# Patient Record
Sex: FEMALE | Race: WHITE | Employment: OTHER | URBAN - METROPOLITAN AREA
[De-identification: names, ages, dates, MRNs, and addresses within clinical notes are randomized per-mention and may not be internally consistent; named-entity substitution may affect disease eponyms.]

---

## 2023-03-23 ENCOUNTER — ANESTHESIA EVENT (OUTPATIENT)
Facility: HOSPITAL | Age: 73
End: 2023-03-23
Payer: MEDICARE

## 2023-03-23 ENCOUNTER — HOSPITAL ENCOUNTER (OUTPATIENT)
Facility: HOSPITAL | Age: 73
Setting detail: OUTPATIENT SURGERY
Discharge: HOME | End: 2023-03-23
Attending: INTERNAL MEDICINE | Admitting: INTERNAL MEDICINE
Payer: MEDICARE

## 2023-03-23 ENCOUNTER — ANESTHESIA (OUTPATIENT)
Facility: HOSPITAL | Age: 73
End: 2023-03-23
Payer: MEDICARE

## 2023-03-23 VITALS
BODY MASS INDEX: 20.93 KG/M2 | HEIGHT: 68 IN | WEIGHT: 138.1 LBS | OXYGEN SATURATION: 100 % | RESPIRATION RATE: 18 BRPM | TEMPERATURE: 97.5 F | HEART RATE: 70 BPM | DIASTOLIC BLOOD PRESSURE: 66 MMHG | SYSTOLIC BLOOD PRESSURE: 119 MMHG

## 2023-03-23 PROCEDURE — 6360000002 HC RX W HCPCS: Performed by: NURSE ANESTHETIST, CERTIFIED REGISTERED

## 2023-03-23 PROCEDURE — 3600007502: Performed by: INTERNAL MEDICINE

## 2023-03-23 PROCEDURE — 3700000001 HC ADD 15 MINUTES (ANESTHESIA): Performed by: INTERNAL MEDICINE

## 2023-03-23 PROCEDURE — 3700000000 HC ANESTHESIA ATTENDED CARE: Performed by: INTERNAL MEDICINE

## 2023-03-23 PROCEDURE — 7100000011 HC PHASE II RECOVERY - ADDTL 15 MIN: Performed by: INTERNAL MEDICINE

## 2023-03-23 PROCEDURE — 7100000010 HC PHASE II RECOVERY - FIRST 15 MIN: Performed by: INTERNAL MEDICINE

## 2023-03-23 PROCEDURE — 2580000003 HC RX 258: Performed by: INTERNAL MEDICINE

## 2023-03-23 PROCEDURE — 3600007512: Performed by: INTERNAL MEDICINE

## 2023-03-23 PROCEDURE — 2709999900 HC NON-CHARGEABLE SUPPLY: Performed by: INTERNAL MEDICINE

## 2023-03-23 RX ORDER — CARBOXYMETHYLCELLULOSE SODIUM 5 MG/ML
1 SOLUTION/ DROPS OPHTHALMIC 2 TIMES DAILY
COMMUNITY
Start: 2022-11-10 | End: 2023-11-10

## 2023-03-23 RX ORDER — SODIUM CHLORIDE 9 MG/ML
INJECTION, SOLUTION INTRAVENOUS CONTINUOUS
Status: DISCONTINUED | OUTPATIENT
Start: 2023-03-23 | End: 2023-03-23 | Stop reason: HOSPADM

## 2023-03-23 RX ORDER — PSEUDOEPHEDRINE HCL 30 MG
1 TABLET ORAL AS NEEDED
COMMUNITY
Start: 2020-10-08

## 2023-03-23 RX ORDER — RALOXIFENE HYDROCHLORIDE 60 MG/1
1 TABLET, FILM COATED ORAL DAILY
COMMUNITY
Start: 2018-09-06 | End: 2023-08-26

## 2023-03-23 RX ORDER — ALMOTRIPTAN 12.5 MG/1
1 TABLET, FILM COATED ORAL AS NEEDED
COMMUNITY
Start: 2020-08-27

## 2023-03-23 RX ORDER — PROPOFOL 10 MG/ML
INJECTION, EMULSION INTRAVENOUS PRN
Status: DISCONTINUED | OUTPATIENT
Start: 2023-03-23 | End: 2023-03-23 | Stop reason: SDUPTHER

## 2023-03-23 RX ORDER — ATORVASTATIN CALCIUM 10 MG/1
1 TABLET, FILM COATED ORAL DAILY
COMMUNITY
Start: 2008-01-17 | End: 2023-08-26

## 2023-03-23 RX ADMIN — PROPOFOL 70 MG: 10 INJECTION, EMULSION INTRAVENOUS at 14:51

## 2023-03-23 RX ADMIN — PROPOFOL 20 MG: 10 INJECTION, EMULSION INTRAVENOUS at 14:56

## 2023-03-23 RX ADMIN — PROPOFOL 30 MG: 10 INJECTION, EMULSION INTRAVENOUS at 14:54

## 2023-03-23 RX ADMIN — SODIUM CHLORIDE: 9 INJECTION, SOLUTION INTRAVENOUS at 14:25

## 2023-03-23 ASSESSMENT — PAIN - FUNCTIONAL ASSESSMENT: PAIN_FUNCTIONAL_ASSESSMENT: 0-10

## 2023-03-23 NOTE — H&P
vaginal route 2 times every week  01/14/2022 01/14/2022  N  Evista 60 mg tablet  take 1 tablet by oral route  every day  08/26/2022 08/26/2022  N  Lipitor 10 mg tablet  take 1 tablet by oral route  every day  08/26/2022 08/26/2022  N  almotriptan malate 12.5 mg tablet  take 1 tablet by oral route once ; if headache returns, the dose may be repeated after 2 hours, no morethan two doses per 24 hrs  11/10/2022    11/10/2022  N    Patient Status   Completed with information received for patient transitioning into care. Completed with information received for patient in a summary of care record. Medication Reconciliation  Medications reconciled today.     Medication Reviewed  Adherence  Medication Name  Sig Desc  Elsewhere  Status  taking as directed  Caltrate 600+D Plus Minerals 600 mg-400 unit Tab  take 2 by Oral route  every day  N  Verified  taking as directed  Fish Oil 1,000 mg capsule    Y  Verified  taking as directed  Refresh Plus 0.5 % eye drops in a dropperette    Y  Verified  taking as directed  ALLEGRA-D 24 HOUR TABLET  take 1 tablet by mouth once daily  N  Verified  taking as directed  Premarin 0.625 mg/gram vaginal cream  insert (0.5G)  by vaginal route 2 times every week  N  Verified  taking as directed  Evista 60 mg tablet  take 1 tablet by oral route  every day  N  Verified  taking as directed  Lipitor 10 mg tablet  take 1 tablet by oral route  every day  N  Verified  taking as directed  almotriptan malate 12.5 mg tablet  take 1 tablet by oral route once ; if headache returns, the dose may be repeated after 2 hours, no morethan two doses per 24 hrs  N  Verified    Medications (Added, Continued or Stopped today)  Start Date  Medication  Directions  PRN Status  PRN Reason  Instruction  Stop Date  12/09/2021  ALLEGRA-D 24 HOUR TABLET  take 1 tablet by mouth once daily  N        11/10/2022  almotriptan malate 12.5 mg tablet  take 1 tablet by oral route once ; if headache returns, the dose may
66.2

## 2023-03-23 NOTE — ANESTHESIA POSTPROCEDURE EVALUATION
Department of Anesthesiology  Postprocedure Note    Patient: Elsa Bach  MRN: [id number]  YOB: 1950  Date of evaluation: 3/23/2023      Procedure Summary     Date: 03/23/23 Room / Location: [facility] ENDO 02 / [facility] ENDOSCOPY    Anesthesia Start: 7740 Anesthesia Stop: 8817    Procedure: COLONOSCOPY (Lower GI Region) Diagnosis:       Encounter for screening colonoscopy      (ENCOUNTER FOR SCREENING FOR MALIGNANT NEOPLASM OF COLON)    Surgeons: Jamie Vicente MD Responsible Provider: Nina Cespedes DO    Anesthesia Type: MAC ASA Status: 1          Anesthesia Type: MAC    Cole Phase I: Cole Score: 10    Cole Phase II: Cole Score: 10      Anesthesia Post Evaluation    Patient location during evaluation: PACU  Patient participation: complete - patient participated  Level of consciousness: awake and alert  Pain score: 0  Airway patency: patent  Nausea & Vomiting: no nausea  Complications: no  Cardiovascular status: blood pressure returned to baseline  Respiratory status: acceptable  Hydration status: euvolemic

## 2023-03-23 NOTE — ANESTHESIA PRE PROCEDURE
Department of Anesthesiology  Preprocedure Note       Name:  Olu Tripp   Age:  68 y.o.  :  1950                                          MRN:  [id number]         Date:  3/23/2023      Surgeon: Guillermo Myers):  Dari Smart MD    Procedure: Procedure(s):  COLONOSCOPY    Medications prior to admission:   Prior to Admission medications    Medication Sig Start Date End Date Taking? Authorizing Provider   raloxifene (EVISTA) 60 MG tablet Take 1 tablet by mouth daily 18 Yes Historical Provider, MD   pseudoephedrine (SUDAFED) 30 MG tablet Take 1 tablet by mouth as needed 10/8/20  Yes Historical Provider, MD   estrogens conjugated (PREMARIN) 0.625 MG/GM CREA vaginal cream Place 0.5 g vaginally Twice a Week 18  Yes Historical Provider, MD   carboxymethylcellulose (REFRESH PLUS) 0.5 % SOLN ophthalmic solution Apply 1 drop to eye in the morning and at bedtime 11/10/22 11/10/23 Yes Historical Provider, MD   atorvastatin (LIPITOR) 10 MG tablet Take 1 tablet by mouth daily 08 Yes Historical Provider, MD   almotriptan (AXERT) 12.5 MG tablet Take 1 tablet by mouth as needed 20  Yes Historical Provider, MD       Current medications:    No current facility-administered medications for this encounter. Allergies: Allergies   Allergen Reactions    Scopolamine      Other reaction(s): unknown  \"Fall asleep, walk into walls\"         Problem List:  There is no problem list on file for this patient. Past Medical History:        Diagnosis Date    Hyperlipidemia        Past Surgical History:        Procedure Laterality Date    KNEE ARTHROSCOPY Left     MASTECTOMY Right 1987    RETINAL DETACHMENT SURGERY Left 2018       Social History:    Social History     Tobacco Use    Smoking status: Never    Smokeless tobacco: Never   Substance Use Topics    Alcohol use:  Yes     Alcohol/week: 3.0 standard drinks     Types: 3 Glasses of wine per week

## 2023-03-23 NOTE — DISCHARGE INSTRUCTIONS
Arsh London  [id number]  1950    COLON DISCHARGE INSTRUCTIONS    Discomfort:  Redness at IV site- apply warm compress to area; if redness or soreness persist- contact your physician  There may be a slight amount of blood passed from the rectum  Gaseous discomfort- walking, belching will help relieve any discomfort  You should not operate a vehicle for 12 hours  You should not engage in an occupation involving machinery or appliances for rest of today  You may not drink alcoholic beverages for at least 12 hours  Avoid making any critical decisions for at least 24 hour  DIET:   Regular Diet   - however -  remember your colon is empty and a heavy meal will produce gas. Avoid these foods:  vegetables, fried / greasy foods, carbonated drinks for today     ACTIVITY:  You may resume your normal daily activities it is recommended that you spend the remainder of the day resting -  avoid any strenuous activity. CALL M.D.   ANY SIGN OF:   Increasing pain, nausea, vomiting  Abdominal distension (swelling)  New increased bleeding (oral or rectal)  Fever (chills)  Pain in chest area  Bloody discharge from nose or mouth  Shortness of breath      Follow-up Instructions:  F/u with [facility]                          Arsh Smith  [id number]  1950        DISCHARGE SUMMARY from Nurse    The following personal items collected during your admission are returned to you:   Dental Appliance:    Vision:    Hearing Aid:    Jewelry:    Clothing:    Other Valuables:    Valuables sent to safe: Dose (mL/hr) Propofol : *20 mg    DISCHARGE SUMMARY from Nurse    PATIENT INSTRUCTIONS:    After general anesthesia or intravenous sedation, for 24 hours or while taking prescription Narcotics:  Limit your activities  Do not drive and operate hazardous machinery  Do not make important personal or business decisions  Do  not drink alcoholic beverages  If you have not urinated within 8 hours after discharge, please contact your surgeon on

## 2023-03-23 NOTE — PROCEDURES
ulcers, diverticula, and polyps. HEPATIC FLEXURE: The hepatic flexure is normal.   ASCENDING COLON: The mucosa is normal with good vascular pattern and without ulcers, diverticula, and polyps. CECUM: The appendiceal orifice appears normal. The ileocecal valve appears normal. Single AVM  TERMINAL ILEUM: The terminal ileum was not entered. Specimens: No specimens were collected. EBL: none    No prosthetic devices, grafts, tissues, transplant or devices implanted. Withdrawal Time: 9 min    Complications: None; patient tolerated the procedure well. Attending Attestation: I performed the procedure.     Recommendations:    - No further colonoscopies needed    Signed By: Adolfo Herring MD                      3/23/2023

## 2023-03-23 NOTE — PERIOP NOTE
Anesthesia and procedural consent complete and placed in bin to be collected by medical record personnel.

## (undated) DEVICE — CANNULA CUSH AD W/ 14FT TBG

## (undated) DEVICE — WRISTBAND ID AD W2.5XL9.5CM RED VYN ADH CLSR UNI-PRINT

## (undated) DEVICE — SYRINGE MED 3ML CLR PLAS STD N CTRL LUERLOCK TIP DISP

## (undated) DEVICE — TOURNIQUET PHLEB W1XL18IN BLU FLAT RL AND BND REUSE FOR IV

## (undated) DEVICE — Device

## (undated) DEVICE — CATHETER PH SUCT 14FR

## (undated) DEVICE — SINGLE PORT MANIFOLD: Brand: NEPTUNE 2

## (undated) DEVICE — SYRINGE MED 5ML STD CLR PLAS LUERLOCK TIP N CTRL DISP

## (undated) DEVICE — CATHETER IV 22GA L1IN BLU POLYUR STR HUB RADPQ PROTCT +

## (undated) DEVICE — SYRINGE 50ML E/T

## (undated) DEVICE — SOLUTION IV 1000ML 20MEQ K CHL IN 0.9% SOD CHL FLX CONT

## (undated) DEVICE — NEEDLE SYR 18GA L1.5IN RED PLAS HUB S STL BLNT FILL W/O

## (undated) DEVICE — SET ADMIN 16ML TBNG L100IN 2 Y INJ SITE IV PIGGY BK DISP (ORDER IN MULIPLES OF 48)

## (undated) DEVICE — TUBING, SUCTION, 1/4" X 12', STRAIGHT: Brand: MEDLINE

## (undated) DEVICE — KENDALL RADIOLUCENT FOAM MONITORING ELECTRODE RECTANGULAR SHAPE: Brand: KENDALL